# Patient Record
Sex: FEMALE | Race: WHITE | HISPANIC OR LATINO
[De-identification: names, ages, dates, MRNs, and addresses within clinical notes are randomized per-mention and may not be internally consistent; named-entity substitution may affect disease eponyms.]

---

## 2024-04-08 PROBLEM — Z00.00 ENCOUNTER FOR PREVENTIVE HEALTH EXAMINATION: Status: ACTIVE | Noted: 2024-04-08

## 2024-05-06 ENCOUNTER — APPOINTMENT (OUTPATIENT)
Dept: HEART AND VASCULAR | Facility: CLINIC | Age: 24
End: 2024-05-06
Payer: COMMERCIAL

## 2024-05-06 DIAGNOSIS — Q27.30 ARTERIOVENOUS MALFORMATION, SITE UNSPECIFIED: ICD-10-CM

## 2024-05-06 PROCEDURE — G2211 COMPLEX E/M VISIT ADD ON: CPT | Mod: NC,1L

## 2024-05-06 PROCEDURE — 99204 OFFICE O/P NEW MOD 45 MIN: CPT

## 2024-05-06 NOTE — REASON FOR VISIT
[Consultation] : a consultation visit [Family Member] : family member [[employer] ] : provided by [employer]   [FreeTextEntry1] : AVM [Interpreters_IDNumber] : [id number] [Interpreters_FullName] : Jose [TWNoteComboBox1] : [place]

## 2024-05-06 NOTE — ASSESSMENT
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 It has been painful for the last few years and the pain is now constant and unrelated to activity. She was referred through [facility] where she apparently had a MRI scan. We have the report but the scans are not in the PACS system for some reason. We will track them down. According to the scan report there is a vascular malformation in the area with some associated calcification. On physical exam there is a fullness in the popliteal fossa which is a mildly tender. I did an ultrasound in the office which shows some abnormal vascular structures as well as what appears to be a calcified mass which is of uncertain significance. In addition to the pain she said the knee also gives out on occasion. Neither the patient nor her brother speak English so the consult was conducted with the aid of the language line. I went through the nature of vascular malformations and the recommended treatment which would most likely be direct embolization. I answered all of their questions. She is otherwise in good health and denies any allergies. Once we get copies of the actual scans I will contact them and make arrangements for a treatment.

## 2024-05-06 NOTE — DATA REVIEWED
[FreeTextEntry1] : =====================================================================  MR Left KNEE w/ and w/o IV: 1/22/2024: Findings consistent with a vascular malformation along the lateral and posterolateral aspect of the left knee involving the distal aspect of the vastus lateralis muscle, the myofascial plane between the biceps femoris muscle and the lateral femoral condyle, and the proximal portions of the lateral gastrocnemius and plantaris muscles.   Prominent perineural vasculature about the distal sciatic nerve the common tibial nerve. This is nonspecific but may be related to venous congestion related to the vascular malformation.   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

## 2024-05-06 NOTE — PHYSICAL EXAM
[Alert] : alert [No Acute Distress] : no acute distress [Well Nourished] : well nourished [Well Developed] : well developed [Normal Sclera/Conjunctiva] : normal sclera/conjunctiva [EOMI] : extra occular movement intact [Normal Hearing] : hearing was normal [No Neck Mass] : no neck mass was observed [Supple] : the neck was supple [No Respiratory Distress] : no respiratory distress [No Accessory Muscle Use] : no accessory muscle use [No Edema] : there was no peripheral edema [Not Tender] : non-tender [Soft] : abdomen soft [Not Distended] : not distended [Spine Straight] : spine straight [No Stigmata of Cushings Syndrome] : no stigmata of cushings syndrome [Normal Gait] : normal gait [Normal Strength/Tone] : muscle strength and tone were normal [No Rash] : no rash [No Skin Lesions] : no skin lesions [Oriented x3] : oriented to person, place, and time [Fully active, able to carry on all pre-disease performance without restriction] : Fully active, able to carry on all pre-disease performance without restriction [Normal PMI] : the apical impulse was normal

## 2024-05-06 NOTE — HISTORY OF PRESENT ILLNESS
[FreeTextEntry1] : Ms. Roxann Henderson is a 23-year-old woman with a PMH of anemia (previously on PO iron) and left knee AVM who is referred to Dr. Cruz for knee embolization. Pt reports had left knee pain and visible mass behind the knee for many years but worsened in the past year. Pain prompted her to be evaluated by Dr. Derrell Wei and was sent for an MRI on 1/2024. MRI findings consistent with a vascular malformation along the lateral and posterolateral aspect of the left knee involving the distal aspect of the vastus lateralis muscle, the myofascial plane between the biceps femoris muscle and the lateral femoral condyle, and the proximal portions of the lateral gastrocnemius and plantaris muscles. Prominent perineural vasculature about the distal sciatic nerve the common tibial nerve. Pt was referred to Dr. Cruz for possible embolization given the complexity of the AVM as it extends to the muscle.

## 2024-05-06 NOTE — END OF VISIT
[Time Spent: ___ minutes] : I have spent [unfilled] minutes of time on the encounter. [FreeTextEntry3] : I, Dr. Cruz, personally performed the evaluation and management (E/M) services for this new patient who presents today with (a) new problem(s)/exacerbation of (an) existing condition(s). That E/M includes conducting the examination, assessing all new/exacerbated conditions, and establishing a new plan of care. Today, my ACP, Tangela KWONG, was here to observe my evaluation and management services for this new problem/exacerbated condition to be followed going forward.

## 2025-02-11 ENCOUNTER — TRANSCRIPTION ENCOUNTER (OUTPATIENT)
Age: 25
End: 2025-02-11

## 2025-02-12 ENCOUNTER — TRANSCRIPTION ENCOUNTER (OUTPATIENT)
Age: 25
End: 2025-02-12